# Patient Record
Sex: FEMALE | Race: BLACK OR AFRICAN AMERICAN | NOT HISPANIC OR LATINO | Employment: UNEMPLOYED | URBAN - METROPOLITAN AREA
[De-identification: names, ages, dates, MRNs, and addresses within clinical notes are randomized per-mention and may not be internally consistent; named-entity substitution may affect disease eponyms.]

---

## 2017-10-20 ENCOUNTER — HOSPITAL ENCOUNTER (EMERGENCY)
Facility: OTHER | Age: 62
Discharge: HOME | End: 2017-10-20
Attending: EMERGENCY MEDICINE
Payer: COMMERCIAL

## 2017-10-20 VITALS
OXYGEN SATURATION: 100 % | TEMPERATURE: 98 F | WEIGHT: 190 LBS | DIASTOLIC BLOOD PRESSURE: 88 MMHG | HEART RATE: 80 BPM | SYSTOLIC BLOOD PRESSURE: 138 MMHG | RESPIRATION RATE: 18 BRPM

## 2017-10-20 DIAGNOSIS — R10.9 FLANK PAIN, ACUTE: ICD-10-CM

## 2017-10-20 DIAGNOSIS — N30.01 ACUTE CYSTITIS WITH HEMATURIA: Primary | ICD-10-CM

## 2017-10-20 LAB
BILIRUBIN, POC UA: NEGATIVE
BLOOD, POC UA: ABNORMAL
CLARITY, POC UA: CLEAR
COLOR, POC UA: YELLOW
GLUCOSE, POC UA: NEGATIVE
KETONES, POC UA: NEGATIVE
LEUKOCYTE EST, POC UA: NEGATIVE
NITRITE, POC UA: NEGATIVE
PH UR STRIP: 5.5 [PH]
PROTEIN, POC UA: NEGATIVE
SPECIFIC GRAVITY, POC UA: 1.01
UROBILINOGEN, POC UA: 0.2 E.U./DL

## 2017-10-20 PROCEDURE — 96372 THER/PROPH/DIAG INJ SC/IM: CPT

## 2017-10-20 PROCEDURE — 99284 EMERGENCY DEPT VISIT MOD MDM: CPT | Mod: 25

## 2017-10-20 PROCEDURE — 25000003 PHARM REV CODE 250: Performed by: NURSE PRACTITIONER

## 2017-10-20 PROCEDURE — 87086 URINE CULTURE/COLONY COUNT: CPT

## 2017-10-20 PROCEDURE — 63600175 PHARM REV CODE 636 W HCPCS: Performed by: NURSE PRACTITIONER

## 2017-10-20 PROCEDURE — 81003 URINALYSIS AUTO W/O SCOPE: CPT

## 2017-10-20 RX ORDER — METHOCARBAMOL 750 MG/1
750 TABLET, FILM COATED ORAL EVERY 8 HOURS PRN
Qty: 15 TABLET | Refills: 0 | Status: SHIPPED | OUTPATIENT
Start: 2017-10-20 | End: 2019-11-26

## 2017-10-20 RX ORDER — KETOROLAC TROMETHAMINE 10 MG/1
10 TABLET, FILM COATED ORAL
Status: COMPLETED | OUTPATIENT
Start: 2017-10-20 | End: 2017-10-20

## 2017-10-20 RX ORDER — CEFTRIAXONE 1 G/1
1 INJECTION, POWDER, FOR SOLUTION INTRAMUSCULAR; INTRAVENOUS
Status: COMPLETED | OUTPATIENT
Start: 2017-10-20 | End: 2017-10-20

## 2017-10-20 RX ORDER — ACETAMINOPHEN AND CODEINE PHOSPHATE 300; 30 MG/1; MG/1
1 TABLET ORAL EVERY 8 HOURS PRN
Qty: 12 TABLET | Refills: 0 | Status: SHIPPED | OUTPATIENT
Start: 2017-10-20 | End: 2019-11-26

## 2017-10-20 RX ORDER — CIPROFLOXACIN 500 MG/1
500 TABLET ORAL EVERY 12 HOURS
Qty: 20 TABLET | Refills: 0 | Status: SHIPPED | OUTPATIENT
Start: 2017-10-20 | End: 2017-10-30

## 2017-10-20 RX ADMIN — KETOROLAC TROMETHAMINE 10 MG: 10 TABLET, FILM COATED ORAL at 04:10

## 2017-10-20 RX ADMIN — CEFTRIAXONE SODIUM 1 G: 1 INJECTION, POWDER, FOR SOLUTION INTRAMUSCULAR; INTRAVENOUS at 05:10

## 2017-10-20 NOTE — ED PROVIDER NOTES
Encounter Date: 10/20/2017       History     Chief Complaint   Patient presents with    Back Pain     The history is provided by the patient. No  was used.   Back Pain    This is a new problem. The current episode started two days ago. The problem occurs constantly. The problem has been gradually worsening. The pain is associated with no known injury. Pain location: left flank pain. The quality of the pain is described as aching. The pain does not radiate. The pain is at a severity of 7/10. The symptoms are aggravated by twisting, bending and certain positions. The pain is the same all the time. Pertinent negatives include no chest pain, no fever, no numbness, no weight loss, no headaches, no abdominal pain, no abdominal swelling, no bowel incontinence, no perianal numbness, no bladder incontinence, no dysuria, no pelvic pain, no leg pain, no paresthesias, no paresis, no tingling and no weakness. She has tried muscle relaxants for the symptoms. The treatment provided no relief. Risk factors include obesity, lack of exercise and a sedentary lifestyle.     Review of patient's allergies indicates:  No Known Allergies  Past Medical History:   Diagnosis Date    Hypertension     MI (myocardial infarction)      History reviewed. No pertinent surgical history.  History reviewed. No pertinent family history.  Social History   Substance Use Topics    Smoking status: Current Some Day Smoker     Packs/day: 0.50     Types: Cigarettes    Smokeless tobacco: Never Used    Alcohol use No     Review of Systems   Constitutional: Negative.  Negative for fever and weight loss.   HENT: Negative.  Negative for sore throat.    Eyes: Negative.    Respiratory: Negative.  Negative for shortness of breath.    Cardiovascular: Negative.  Negative for chest pain.   Gastrointestinal: Negative.  Negative for abdominal pain and bowel incontinence.   Genitourinary: Positive for flank pain (left). Negative for bladder  incontinence, dysuria, pelvic pain, vaginal discharge and vaginal pain.   Musculoskeletal: Negative for back pain and neck pain.   Skin: Negative.  Negative for rash.   Allergic/Immunologic: Negative.    Neurological: Negative.  Negative for tingling, weakness, numbness, headaches and paresthesias.   Hematological: Does not bruise/bleed easily.   Psychiatric/Behavioral: Negative.        Physical Exam     Initial Vitals [10/20/17 1613]   BP Pulse Resp Temp SpO2   (!) 150/94 78 18 98 °F (36.7 °C) 97 %      MAP       112.67         Physical Exam    Nursing note and vitals reviewed.  Constitutional: She appears well-developed and well-nourished. She is not diaphoretic.  Non-toxic appearance. She does not appear ill. No distress.   HENT:   Head: Normocephalic and atraumatic.   Eyes: Conjunctivae are normal. Right eye exhibits no discharge. Left eye exhibits no discharge.   Neck: Normal range of motion.   Cardiovascular: Normal rate, regular rhythm, normal heart sounds and intact distal pulses. Exam reveals no gallop and no friction rub.    No murmur heard.  Pulmonary/Chest: Breath sounds normal. No respiratory distress. She has no wheezes. She has no rhonchi. She has no rales. She exhibits no tenderness.   Abdominal: Soft. Normal appearance and bowel sounds are normal. She exhibits no shifting dullness, no distension, no pulsatile liver, no fluid wave, no abdominal bruit, no ascites, no pulsatile midline mass and no mass. There is no hepatosplenomegaly. There is no tenderness. There is CVA tenderness (left).   Musculoskeletal: Normal range of motion.   5/5 motor strength BLE with intact sensation  Negative SLR BLE  Normal heel/toe BLE  2+ reflexes BLE  No bony tenderness  No s/s cauda equina     Neurological: She is alert and oriented to person, place, and time.   Skin: Skin is warm and dry. Capillary refill takes less than 2 seconds. No rash noted.   Psychiatric: She has a normal mood and affect. Her behavior is normal.  Judgment and thought content normal.         ED Course   Procedures  Labs Reviewed   POCT URINALYSIS W/O SCOPE - Abnormal; Notable for the following:        Result Value    Glucose, UA Negative (*)     Bilirubin, UA Negative (*)     Ketones, UA Negative (*)     Blood, UA 2+ (*)     Protein, UA Negative (*)     Nitrite, UA Negative (*)     Leukocytes, UA Negative (*)     All other components within normal limits   CULTURE, URINE   POCT URINALYSIS W/O SCOPE     Imaging Results          CT Renal Stone Study ABD Pelvis WO (Final result)  Result time 10/20/17 17:00:36    Final result by Tavo Fagan MD (10/20/17 17:00:36)                 Impression:      1.  No acute CT abnormality within the abdomen or pelvis to correlate with left flank pain.    2.  Pulmonary micronodules within the right upper and right lower lobe, fall below established criteria for followup.    3.  Dependent atelectasis primarily involving the left lower lobe.    4.  Several additional findings above.        Electronically signed by: TAVO FAGAN MD  Date:     10/20/17  Time:    17:00              Narrative:    Clinical History: Left flank pain    Technique: Axial images of the abdomen and pelvis were obtained at 5-mm intervals without the administration of contrast following the renal stone study protocol.    Comparison:None    Findings:    Images of the lower thorax are remarkable for a 1-2 mm pulmonary nodule within the right upper lobe, as well as a 1-2 mm pulmonary nodule within the right lower lobe.  There is bilateral basilar subsegmental atelectasis or scarring, left greater than right.    The liver, spleen, pancreas, and adrenal glands are grossly unremarkable noncontrast appearance.  There may be cholelithiasis or sludge versus artifact, without wall cH and or wall thickening.  The common duct is nondilated.  The pancreatic duct is nondilated.  No significant abdominal lymphadenopathy.  No ascites.    The kidneys have a grossly  unremarkable noncontrast appearance without hydronephrosis or nephrolithiasis.  The bilateral ureters are unremarkable without calculi seen.  The urinary bladder is unremarkable.  The uterus is absent the adnexa is unremarkable.  There is trace fluid in the deep pelvis.    There are a few scattered colonic diverticula without inflammation.  The terminal ileum and appendix are unremarkable.  The small bowel is grossly unremarkable.  No bulky pelvic lymphadenopathy.  No focal organized pelvic fluid collection.  There is atherosclerotic calcification of the aorta and its branches.    Degenerative changes are noted of the lower lumbar spine without focal osseous destructive process.    No significant inguinal lymphadenopathy.                                      Medical Decision Making:   Initial Assessment:   UTI, flank pain  Differential Diagnosis:   Pyelo, kidney stone, muscle spasm  Clinical Tests:   Lab Tests: Reviewed and Ordered  The following lab test(s) were unremarkable: Urinalysis  Radiological Study: Ordered and Reviewed  Rocephin IM given in ER.  Pt will be discharged home on Cipro, Robaxin and Tylenol #3 with instructions to f/u with PCP in 3 days, drink 6-8 glasses of water daily, refrain from strenuous activity, rest, and return to ER as needed if symptoms worsen/fail to improve. Pt verbalized understanding of discharge instructions and treatment plan.                   ED Course      Clinical Impression:   The primary encounter diagnosis was Acute cystitis with hematuria. A diagnosis of Flank pain, acute was also pertinent to this visit.                           Toussaint Battley III, DOROTHY  10/20/17 1845

## 2017-10-22 LAB — BACTERIA UR CULT: NORMAL

## 2019-10-07 ENCOUNTER — HOSPITAL ENCOUNTER (EMERGENCY)
Facility: HOSPITAL | Age: 64
Discharge: HOME | End: 2019-10-07
Attending: EMERGENCY MEDICINE
Payer: COMMERCIAL

## 2019-10-07 VITALS
HEIGHT: 69 IN | DIASTOLIC BLOOD PRESSURE: 86 MMHG | HEART RATE: 81 BPM | RESPIRATION RATE: 18 BRPM | WEIGHT: 195 LBS | BODY MASS INDEX: 28.88 KG/M2 | TEMPERATURE: 99 F | SYSTOLIC BLOOD PRESSURE: 148 MMHG | OXYGEN SATURATION: 99 %

## 2019-10-07 DIAGNOSIS — M79.604 LEG PAIN, RIGHT: ICD-10-CM

## 2019-10-07 DIAGNOSIS — M54.16 LUMBAR RADICULOPATHY, ACUTE: Primary | ICD-10-CM

## 2019-10-07 DIAGNOSIS — M54.31 SCIATICA OF RIGHT SIDE WITHOUT BACK PAIN: ICD-10-CM

## 2019-10-07 PROCEDURE — 25000003 PHARM REV CODE 250: Mod: ER | Performed by: EMERGENCY MEDICINE

## 2019-10-07 PROCEDURE — 96372 THER/PROPH/DIAG INJ SC/IM: CPT | Mod: ER

## 2019-10-07 PROCEDURE — 63600175 PHARM REV CODE 636 W HCPCS: Mod: ER | Performed by: EMERGENCY MEDICINE

## 2019-10-07 PROCEDURE — 99284 EMERGENCY DEPT VISIT MOD MDM: CPT | Mod: 25,ER

## 2019-10-07 RX ORDER — DIAZEPAM 5 MG/1
5 TABLET ORAL EVERY 8 HOURS PRN
Qty: 10 TABLET | Refills: 0 | Status: SHIPPED | OUTPATIENT
Start: 2019-10-07 | End: 2019-11-26

## 2019-10-07 RX ORDER — IBUPROFEN 600 MG/1
600 TABLET ORAL EVERY 8 HOURS PRN
Qty: 15 TABLET | Refills: 0 | Status: SHIPPED | OUTPATIENT
Start: 2019-10-07 | End: 2019-11-26

## 2019-10-07 RX ORDER — DEXAMETHASONE SODIUM PHOSPHATE 4 MG/ML
4 INJECTION, SOLUTION INTRA-ARTICULAR; INTRALESIONAL; INTRAMUSCULAR; INTRAVENOUS; SOFT TISSUE
Status: COMPLETED | OUTPATIENT
Start: 2019-10-07 | End: 2019-10-07

## 2019-10-07 RX ORDER — KETOROLAC TROMETHAMINE 30 MG/ML
30 INJECTION, SOLUTION INTRAMUSCULAR; INTRAVENOUS
Status: COMPLETED | OUTPATIENT
Start: 2019-10-07 | End: 2019-10-07

## 2019-10-07 RX ORDER — METHYLPREDNISOLONE 4 MG/1
TABLET ORAL
Qty: 1 PACKAGE | Refills: 0 | Status: SHIPPED | OUTPATIENT
Start: 2019-10-07 | End: 2019-10-28

## 2019-10-07 RX ORDER — HYDROCODONE BITARTRATE AND ACETAMINOPHEN 5; 325 MG/1; MG/1
1 TABLET ORAL
Status: COMPLETED | OUTPATIENT
Start: 2019-10-07 | End: 2019-10-07

## 2019-10-07 RX ADMIN — KETOROLAC TROMETHAMINE 30 MG: 30 INJECTION, SOLUTION INTRAMUSCULAR at 09:10

## 2019-10-07 RX ADMIN — HYDROCODONE BITARTRATE AND ACETAMINOPHEN 1 TABLET: 5; 325 TABLET ORAL at 09:10

## 2019-10-07 RX ADMIN — DEXAMETHASONE SODIUM PHOSPHATE 4 MG: 4 INJECTION, SOLUTION INTRA-ARTICULAR; INTRALESIONAL; INTRAMUSCULAR; INTRAVENOUS; SOFT TISSUE at 09:10

## 2019-10-07 NOTE — ED PROVIDER NOTES
Encounter Date: 10/7/2019    SCRIBE #1 NOTE: I, Lito Jack, am scribing for, and in the presence of,  Dr. Peters. I have scribed the following portions of the note - Other sections scribed: HPI, ROS, PE.       History     Chief Complaint   Patient presents with    Hip Pain     right hip pain, intermittent for weeks, denies injury or fall, states increased significantly this am when getting ready for work     Ms. Bettina Malhotra is a 64 y.o. female with history of HTN and MI who presents to the ED complaining of intermittent right hip pain radiating to the right leg for several weeks. Pain initially started in the right lower back. Patient reports that the pain increased significantly today when getting ready for work. Standing helps to mildly decrease the pain and laying down worsens the pain. Denies any numbness or tingling, trauma, falls, or recent injuries. Patient has been taking OTC pain reliever and applied muscle aching ointment.    The history is provided by the patient. No  was used.     Review of patient's allergies indicates:  No Known Allergies  Past Medical History:   Diagnosis Date    Hypertension     MI (myocardial infarction)      Past Surgical History:   Procedure Laterality Date    HYSTERECTOMY       History reviewed. No pertinent family history.  Social History     Tobacco Use    Smoking status: Current Some Day Smoker     Packs/day: 0.50     Types: Cigarettes    Smokeless tobacco: Never Used   Substance Use Topics    Alcohol use: No    Drug use: No     Review of Systems   Constitutional: Negative for activity change.   Respiratory: Negative for shortness of breath.    Cardiovascular: Negative for leg swelling.   Gastrointestinal: Negative for vomiting.   Musculoskeletal: Positive for myalgias (right hip and leg). Negative for back pain (resolved).   Neurological: Negative for speech difficulty.   All other systems reviewed and are negative.      Physical Exam     Initial  Vitals [10/07/19 0749]   BP Pulse Resp Temp SpO2   (!) 156/72 84 20 97.5 °F (36.4 °C) 100 %      MAP       --         Physical Exam    Nursing note and vitals reviewed.  Constitutional: She appears well-developed and well-nourished.   HENT:   Head: Normocephalic and atraumatic.   Eyes: Conjunctivae are normal.   Neck: Normal range of motion. Neck supple.   Cardiovascular: Normal rate, regular rhythm, normal heart sounds and intact distal pulses. Exam reveals no gallop and no friction rub.    No murmur heard.  Pulmonary/Chest: Effort normal and breath sounds normal. No respiratory distress. She has no wheezes. She has no rhonchi. She has no rales.   Abdominal: Soft. Bowel sounds are normal.   Musculoskeletal: Normal range of motion.   Negative straight leg raise.   Neurological: She is alert and oriented to person, place, and time.   Skin: Skin is warm and dry.   Psychiatric: She has a normal mood and affect.         ED Course   Procedures  Labs Reviewed - No data to display       Imaging Results          X-Ray Lumbar Spine Ap And Lateral (Final result)  Result time 10/07/19 09:14:54    Final result by Geoff Nava Jr., MD (10/07/19 09:14:54)                 Impression:      Degenerative change as above.      Electronically signed by: Geoff Nava MD  Date:    10/07/2019  Time:    09:14             Narrative:    EXAMINATION:  XR LUMBAR SPINE AP AND LATERAL    CLINICAL HISTORY:  Lumbar radiculopathy, risk factors (osteoporosis or chronic steroid use or elderly);    TECHNIQUE:  AP, lateral and spot images were performed of the lumbar spine.    COMPARISON:  None    FINDINGS:  Degenerative change at the SI joints.  Mild narrowing at the T11-12 disc level.  Vertebral body heights and alignment is satisfactory.  Facet arthropathy lower lumbar levels.  Vascular calcifications present.                                 Medical Decision Making:   History:   Old Medical Records: I decided to obtain old medical  records.  Clinical Tests:   Radiological Study: Reviewed and Ordered  ED Management:  Ms. Malhotra feel some better.  She is resting.  She states she is at least 50% better.  She would like to go home.  I clinically suspect sciatica.  We discussed home care and worrisome signs that should prompt need to return to the ER should they occur.  There is no indication for further emergent intervention or evaluation at this time.            Scribe Attestation:   Scribe #1: I performed the above scribed service and the documentation accurately describes the services I performed. I attest to the accuracy of the note.               Clinical Impression:     1. Lumbar radiculopathy, acute    2. Leg pain, right    3. Sciatica of right side without back pain                                   Otto Peters MD  10/08/19 1952

## 2019-11-26 PROBLEM — M54.41 LOW BACK PAIN WITH RIGHT-SIDED SCIATICA: Status: ACTIVE | Noted: 2019-11-26

## 2019-11-26 PROBLEM — E78.5 HYPERLIPIDEMIA: Status: ACTIVE | Noted: 2019-11-26

## 2019-11-26 PROBLEM — I10 HYPERTENSION, ESSENTIAL: Status: ACTIVE | Noted: 2019-11-26

## 2020-03-12 PROBLEM — R55 SYNCOPE: Status: ACTIVE | Noted: 2020-03-12

## 2020-10-16 PROBLEM — R60.9 EDEMA: Status: ACTIVE | Noted: 2020-10-16

## 2020-10-16 PROBLEM — R06.00 DYSPNEA: Status: ACTIVE | Noted: 2020-10-16

## 2020-12-30 NOTE — ED TRIAGE NOTES
C/o intermittent left flank pain x 3 days, denies dysuria   Tested: EKG     LM with results and advised Mom to call if she had any concerns.

## 2021-03-22 PROBLEM — R73.03 PREDIABETES: Status: ACTIVE | Noted: 2021-03-22

## 2022-07-30 ENCOUNTER — IMMUNIZATION (OUTPATIENT)
Dept: PRIMARY CARE CLINIC | Facility: CLINIC | Age: 67
End: 2022-07-30
Payer: COMMERCIAL

## 2022-07-30 DIAGNOSIS — Z23 NEED FOR VACCINATION: Primary | ICD-10-CM

## 2022-07-30 PROCEDURE — 0054A COVID-19, MRNA, LNP-S, PF, 30 MCG/0.3 ML DOSE VACCINE (PFIZER): CPT | Mod: CV19,PBBFAC | Performed by: INTERNAL MEDICINE
